# Patient Record
Sex: FEMALE | Race: WHITE | NOT HISPANIC OR LATINO
[De-identification: names, ages, dates, MRNs, and addresses within clinical notes are randomized per-mention and may not be internally consistent; named-entity substitution may affect disease eponyms.]

---

## 2022-12-08 ENCOUNTER — DASHBOARD ENCOUNTERS (OUTPATIENT)
Age: 82
End: 2022-12-08

## 2022-12-08 ENCOUNTER — WEB ENCOUNTER (OUTPATIENT)
Dept: URBAN - METROPOLITAN AREA CLINIC 88 | Facility: CLINIC | Age: 82
End: 2022-12-08

## 2022-12-08 ENCOUNTER — LAB OUTSIDE AN ENCOUNTER (OUTPATIENT)
Dept: URBAN - METROPOLITAN AREA CLINIC 88 | Facility: CLINIC | Age: 82
End: 2022-12-08

## 2022-12-08 ENCOUNTER — OFFICE VISIT (OUTPATIENT)
Dept: URBAN - METROPOLITAN AREA CLINIC 88 | Facility: CLINIC | Age: 82
End: 2022-12-08
Payer: COMMERCIAL

## 2022-12-08 VITALS
HEIGHT: 60 IN | WEIGHT: 131.3 LBS | SYSTOLIC BLOOD PRESSURE: 154 MMHG | BODY MASS INDEX: 25.78 KG/M2 | HEART RATE: 73 BPM | DIASTOLIC BLOOD PRESSURE: 79 MMHG | TEMPERATURE: 98.1 F

## 2022-12-08 DIAGNOSIS — K80.43: ICD-10-CM

## 2022-12-08 PROCEDURE — 99214 OFFICE O/P EST MOD 30 MIN: CPT | Performed by: REGISTERED NURSE

## 2022-12-08 RX ORDER — WARFARIN SODIUM 2.5 MG/1
1 TABLET TABLET ORAL ONCE A DAY
Status: ACTIVE | COMMUNITY

## 2022-12-08 RX ORDER — METOPROLOL SUCCINATE 25 MG/1
TAKE ONE TABLET BY MOUTH EVERY MORNING TABLET, EXTENDED RELEASE ORAL
Qty: 90 UNSPECIFIED | Refills: 0 | Status: ACTIVE | COMMUNITY

## 2022-12-08 RX ORDER — TRAZODONE HYDROCHLORIDE 50 MG/1
TAKE ONE TABLET BY MOUTH EVERY NIGHT TABLET ORAL
Qty: 90 UNSPECIFIED | Refills: 0 | Status: DISCONTINUED | COMMUNITY

## 2022-12-08 RX ORDER — LEVOFLOXACIN 250 MG/1
TAKE ONE TABLET BY MOUTH EVERY MORNING FOR 5 DAYS TABLET, FILM COATED ORAL
Qty: 5 UNSPECIFIED | Refills: 0 | Status: DISCONTINUED | COMMUNITY

## 2022-12-08 RX ORDER — ONDANSETRON 4 MG/1
PLACE ONE TABLET ON THE TONGUE EVERY 8 HOURS AS NEEDED FOR NAUSEA AND VOMITING TABLET, ORALLY DISINTEGRATING ORAL
Qty: 20 UNSPECIFIED | Refills: 1 | Status: DISCONTINUED | COMMUNITY

## 2022-12-08 RX ORDER — METRONIDAZOLE 500 MG/1
TAKE ONE TABLET BY MOUTH THREE TIMES A DAY IN THE MORNING, EVENING, AND BEFORE BEDTIME FOR 5 DAYS TABLET, FILM COATED ORAL
Qty: 15 UNSPECIFIED | Refills: 0 | Status: DISCONTINUED | COMMUNITY

## 2022-12-08 RX ORDER — LEVOTHYROXINE SODIUM 25 UG/1
TAKE ONE TABLET BY MOUTH EVERY MORNING TABLET ORAL
Qty: 90 UNSPECIFIED | Refills: 0 | Status: ACTIVE | COMMUNITY

## 2022-12-08 RX ORDER — FUROSEMIDE 20 MG/1
1 TABLET TABLET ORAL ONCE A DAY
Status: ACTIVE | COMMUNITY

## 2022-12-08 RX ORDER — DOXEPIN HYDROCHLORIDE 25 MG/1
TAKE ONE CAPSULE BY MOUTH EVERY NIGHT CAPSULE ORAL
Qty: 90 UNSPECIFIED | Refills: 0 | Status: DISCONTINUED | COMMUNITY

## 2022-12-08 RX ORDER — AMIODARONE HYDROCHLORIDE 200 MG/1
TAKE ONE TABLET BY MOUTH TWICE A DAY IN THE MORNING AND BEFORE BEDTIME FOR 13 DAYS, THEN TAKE ONE TABLET BY MOUTH ONE TIME DAILY THEREAFTER TABLET ORAL
Qty: 43 UNSPECIFIED | Refills: 0 | Status: ACTIVE | COMMUNITY

## 2022-12-08 NOTE — HPI-TODAY'S VISIT:
Pt here for f/u from recent hospital stay. She had gallstones with acute cholecystitis. ERCP was performed by Dr Driscoll on 11/10/22 with sphincterotomy and stent placement. He was not able to fully visualized the entire biliary tree due to bleeding and technical difficulties. Cholecystectomy with IOC was performed on 11/11/22. IOC showed a filling defect within the CBD. LFTs continued to trend down during her hospital stay. LFTs were rechecked on 12/2/22 and were normal. She is feeling well with no GI complaints at this time.  During her hospital stay she was also started on anticoagulation due afib. She is seen at [facility] by Dr Yang. She has an appointment with him on 12/12/22.

## 2023-01-20 ENCOUNTER — TELEPHONE ENCOUNTER (OUTPATIENT)
Dept: URBAN - METROPOLITAN AREA CLINIC 118 | Facility: CLINIC | Age: 83
End: 2023-01-20

## 2023-03-15 ENCOUNTER — OFFICE VISIT (OUTPATIENT)
Dept: URBAN - METROPOLITAN AREA MEDICAL CENTER 16 | Facility: MEDICAL CENTER | Age: 83
End: 2023-03-15
Payer: COMMERCIAL

## 2023-03-15 DIAGNOSIS — K80.50 BILE DUCT CALCULUS: ICD-10-CM

## 2023-03-15 DIAGNOSIS — Z46.59 ENCOUNTER FOR CARE RELATED TO FEEDING TUBE: ICD-10-CM

## 2023-03-15 PROCEDURE — 43275 ERCP REMOVE FORGN BODY DUCT: CPT | Performed by: INTERNAL MEDICINE

## 2023-03-15 PROCEDURE — 43264 ERCP REMOVE DUCT CALCULI: CPT | Performed by: INTERNAL MEDICINE

## 2023-03-20 ENCOUNTER — TELEPHONE ENCOUNTER (OUTPATIENT)
Dept: URBAN - METROPOLITAN AREA CLINIC 118 | Facility: CLINIC | Age: 83
End: 2023-03-20